# Patient Record
Sex: FEMALE | Race: WHITE | NOT HISPANIC OR LATINO | Employment: FULL TIME | URBAN - METROPOLITAN AREA
[De-identification: names, ages, dates, MRNs, and addresses within clinical notes are randomized per-mention and may not be internally consistent; named-entity substitution may affect disease eponyms.]

---

## 2017-10-22 ENCOUNTER — APPOINTMENT (OUTPATIENT)
Dept: GENERAL RADIOLOGY | Facility: HOSPITAL | Age: 22
End: 2017-10-22

## 2017-10-22 ENCOUNTER — HOSPITAL ENCOUNTER (EMERGENCY)
Facility: HOSPITAL | Age: 22
Discharge: HOME | End: 2017-10-22
Attending: EMERGENCY MEDICINE | Admitting: EMERGENCY MEDICINE

## 2017-10-22 VITALS
OXYGEN SATURATION: 98 % | DIASTOLIC BLOOD PRESSURE: 89 MMHG | HEIGHT: 66 IN | WEIGHT: 140 LBS | SYSTOLIC BLOOD PRESSURE: 137 MMHG | TEMPERATURE: 99.1 F | RESPIRATION RATE: 18 BRPM | BODY MASS INDEX: 22.5 KG/M2 | HEART RATE: 72 BPM
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
## 2017-10-22 DIAGNOSIS — S16.1XXA STRAIN OF NECK MUSCLE, INITIAL ENCOUNTER: ICD-10-CM

## 2017-10-22 PROCEDURE — 99282 EMERGENCY DEPT VISIT SF MDM: CPT

## 2017-10-22 PROCEDURE — 72050 X-RAY EXAM NECK SPINE 4/5VWS: CPT

## 2017-10-22 RX ORDER — DESOGESTREL AND ETHINYL ESTRADIOL 0.15-0.03
1 KIT ORAL DAILY
COMMUNITY

## 2017-10-22 RX ORDER — MONTELUKAST SODIUM 10 MG/1
10 TABLET ORAL NIGHTLY
COMMUNITY

## 2017-10-22 RX ORDER — CETIRIZINE HYDROCHLORIDE 10 MG/1
10 TABLET ORAL DAILY
COMMUNITY

## 2017-10-22 RX ORDER — DICLOFENAC SODIUM 75 MG/1
75 TABLET, DELAYED RELEASE ORAL 2 TIMES DAILY PRN
Qty: 20 TABLET | Refills: 0 | Status: SHIPPED | OUTPATIENT
Start: 2017-10-22

## 2017-10-22 RX ORDER — CYCLOBENZAPRINE HCL 10 MG
10 TABLET ORAL 3 TIMES DAILY PRN
Qty: 21 TABLET | Refills: 0 | Status: SHIPPED | OUTPATIENT
Start: 2017-10-22
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
## 2023-09-01 ENCOUNTER — OFFICE VISIT (OUTPATIENT)
Dept: CARDIOLOGY | Facility: CLINIC | Age: 28
End: 2023-09-01

## 2023-09-01 VITALS
BODY MASS INDEX: 23.14 KG/M2 | HEIGHT: 66 IN | SYSTOLIC BLOOD PRESSURE: 116 MMHG | DIASTOLIC BLOOD PRESSURE: 72 MMHG | OXYGEN SATURATION: 100 % | WEIGHT: 144 LBS | HEART RATE: 53 BPM

## 2023-09-01 DIAGNOSIS — R00.1 BRADYCARDIA, SINUS: Primary | ICD-10-CM

## 2023-09-01 RX ORDER — COPPER 313.4 MG/1
1 INTRAUTERINE DEVICE INTRAUTERINE ONCE
COMMUNITY

## 2023-09-01 RX ORDER — ALBUTEROL SULFATE 90 UG/1
2 AEROSOL, METERED RESPIRATORY (INHALATION) EVERY 6 HOURS PRN
COMMUNITY

## 2023-09-01 NOTE — PROGRESS NOTES
Subjective:     Encounter Date:09/01/23      Patient ID: Sonal Temple is a 27 y.o. female.    Chief Complaint:  History of Present Illness    Dear Benjamin KRUEGER,    I had the pleasure of seeing this patient in the office today for initial evaluation and consultation.  I appreciate that you sent her in to see us.  They come in today to be seen for evaluation of an abnormal EKG prior to starting stimulant medication therapy.    Patient had an EKG obtained at [facility], this showed sinus bradycardia with a rate of 57 bpm, no other abnormality was seen.  QT was normal.    This patient has no known cardiac history.  This patient has no history of coronary artery disease, congestive heart failure, rheumatic fever, rheumatic heart disease, congenital heart disease or heart murmur.  This patient has never required invasive cardiovascular evaluation.    She is very active, used to be a competitive , no symptoms.  No prior episodes of presyncope or syncope, no familial history of sudden death.  She denies any chest pain, pressure, tightness, squeezing, or heartburn.  She has not experienced any feeling of palpitations, tachycardia or heart racing and no presyncope or syncope.  There have not been any problems with dizziness or lightheadedness.  There have not been any orthopnea or PND, and no problems with lower extremity edema.  She denies any shortness of breath at rest or with activity and has not had any wheezing.  She has not had any problems with unexplained nausea or vomiting. She has continued to perform daily activities of living without any specific problem or change in the level of activity.  She has not been recently hospitalized for any reason.    The following portions of the patient's history were reviewed and updated as appropriate: allergies, current medications, past family history, past medical history, past social history, past surgical history and problem list.    Procedures      "  Objective:     Vitals:    09/01/23 1005   BP: 116/72   BP Location: Left arm   Patient Position: Sitting   Pulse: 53   SpO2: 100%   Weight: 65.3 kg (144 lb)   Height: 167.6 cm (66\")     Body mass index is 23.24 kg/mý.      Vitals reviewed.   Constitutional:       General: Not in acute distress.     Appearance: Well-developed. Not diaphoretic.   Eyes:      General:         Right eye: No discharge.         Left eye: No discharge.      Conjunctiva/sclera: Conjunctivae normal.   HENT:      Head: Normocephalic and atraumatic.      Nose: Nose normal.   Neck:      Thyroid: No thyromegaly.      Trachea: No tracheal deviation.   Pulmonary:      Effort: Pulmonary effort is normal. No respiratory distress.      Breath sounds: Normal breath sounds. No stridor.   Chest:      Chest wall: Not tender to palpatation.   Cardiovascular:      Normal rate. Regular rhythm.      Murmurs: There is no murmur.      . No S3 gallop. No click. No rub.   Pulses:     Intact distal pulses.   Edema:     Peripheral edema absent.   Abdominal:      General: Bowel sounds are normal. There is no distension.      Palpations: Abdomen is soft. There is no abdominal mass.   Musculoskeletal: Normal range of motion.         General: No tenderness or deformity.      Cervical back: Normal range of motion and neck supple. Skin:     General: Skin is warm and dry.      Findings: No erythema or rash.   Neurological:      Mental Status: Alert.   Psychiatric:         Attention and Perception: Attention normal.       Data and records reviewed:     No results found for: GLUCOSE, BUN, CREATININE, EGFRRESULT, EGFR, BCR, K, CO2, CALCIUM, PROTENTOTREF, ALBUMIN, BILITOT, AST, ALT  No results found for: CHOL  No results found for: TRIG  No results found for: HDL  No results found for: LDL  No results found for: VLDL  No results found for: LDLHDL  CBC          10/19/2022    15:01   CBC   WBC 7.32       RBC 4.50       Hemoglobin 14.4       Hematocrit 43.4       MCV 96.4     "   MCH 32.0       MCHC 33.2       RDW 11.9       Platelets 382          Details          This result is from an external source.             No radiology results for the last 90 days.          Assessment:          Diagnosis Plan   1. Bradycardia, sinus               Plan:       Patient's EKG demonstrates sinus bradycardia which is a benign finding in a prior , no contraindication to utilization of stimulant medication therapy.    Thank you very much for allowing us to participate in the care of this pleasant patient.  Please don't hesitate to call if I can be of assistance in any way.      Current Outpatient Medications:     albuterol sulfate  (90 Base) MCG/ACT inhaler, Inhale 2 puffs Every 6 (Six) Hours As Needed., Disp: , Rfl:     cetirizine (zyrTEC) 10 MG tablet, Take 1 tablet by mouth Daily., Disp: , Rfl:     Paragard Intrauterine Copper intrauterine device IUD, 1 each by Intrauterine route 1 (One) Time., Disp: , Rfl:     cyclobenzaprine (FLEXERIL) 10 MG tablet, Take 1 tablet by mouth 3 (Three) Times a Day As Needed for Muscle Spasms. (Patient not taking: Reported on 9/1/2023), Disp: 21 tablet, Rfl: 0    desogestrel-ethinyl estradiol (APRI) 0.15-30 MG-MCG per tablet, Take 1 tablet by mouth Daily. (Patient not taking: Reported on 9/1/2023), Disp: , Rfl:     diclofenac (VOLTAREN) 75 MG EC tablet, Take 1 tablet by mouth 2 (Two) Times a Day As Needed (pain). (Patient not taking: Reported on 9/1/2023), Disp: 20 tablet, Rfl: 0    montelukast (SINGULAIR) 10 MG tablet, Take 10 mg by mouth Every Night. (Patient not taking: Reported on 9/1/2023), Disp: , Rfl:          No follow-ups on file.

## 2023-09-12 ENCOUNTER — TELEPHONE (OUTPATIENT)
Dept: CARDIOLOGY | Facility: CLINIC | Age: 28
End: 2023-09-12
Payer: COMMERCIAL

## 2023-09-12 NOTE — TELEPHONE ENCOUNTER
Pt lvm stating she was cleared by Dr. Ibarra for stimulants. Says [facility] never received anything adv of that?    No nausea or vomiting

## 2023-09-13 NOTE — TELEPHONE ENCOUNTER
Faxed patient last office note to [facility] at [phone] per Dr. Ibarra. Received fax confirmation.    Renate Warner MA  9/13/23 536f

## 2024-06-11 ENCOUNTER — HOSPITAL ENCOUNTER (EMERGENCY)
Facility: HOSPITAL | Age: 29
Discharge: HOME | End: 2024-06-12
Attending: EMERGENCY MEDICINE
Payer: COMMERCIAL

## 2024-06-11 DIAGNOSIS — R25.3 FASCICULATIONS: Primary | ICD-10-CM

## 2024-06-11 PROCEDURE — 85025 COMPLETE CBC W/AUTO DIFF WBC: CPT | Performed by: EMERGENCY MEDICINE

## 2024-06-11 PROCEDURE — 82550 ASSAY OF CK (CPK): CPT | Performed by: EMERGENCY MEDICINE

## 2024-06-11 PROCEDURE — 96360 HYDRATION IV INFUSION INIT: CPT

## 2024-06-11 PROCEDURE — 80053 COMPREHEN METABOLIC PANEL: CPT | Performed by: EMERGENCY MEDICINE

## 2024-06-11 PROCEDURE — 83735 ASSAY OF MAGNESIUM: CPT | Performed by: EMERGENCY MEDICINE

## 2024-06-11 PROCEDURE — 84703 CHORIONIC GONADOTROPIN ASSAY: CPT | Performed by: EMERGENCY MEDICINE

## 2024-06-11 PROCEDURE — 99283 EMERGENCY DEPT VISIT LOW MDM: CPT

## 2024-06-11 PROCEDURE — 25810000003 LACTATED RINGERS SOLUTION: Performed by: EMERGENCY MEDICINE

## 2024-06-11 RX ADMIN — SODIUM CHLORIDE, POTASSIUM CHLORIDE, SODIUM LACTATE AND CALCIUM CHLORIDE 1000 ML: 600; 310; 30; 20 INJECTION, SOLUTION INTRAVENOUS at 23:50

## 2024-06-12 VITALS
OXYGEN SATURATION: 100 % | HEART RATE: 64 BPM | DIASTOLIC BLOOD PRESSURE: 66 MMHG | BODY MASS INDEX: 22.5 KG/M2 | WEIGHT: 140 LBS | HEIGHT: 66 IN | SYSTOLIC BLOOD PRESSURE: 112 MMHG | RESPIRATION RATE: 18 BRPM | TEMPERATURE: 97.5 F

## 2024-06-12 LAB
ALBUMIN SERPL-MCNC: 4.5 G/DL (ref 3.5–5.2)
ALBUMIN/GLOB SERPL: 1.5 G/DL
ALP SERPL-CCNC: 44 U/L (ref 39–117)
ALT SERPL W P-5'-P-CCNC: 5 U/L (ref 1–33)
ANION GAP SERPL CALCULATED.3IONS-SCNC: 11.4 MMOL/L (ref 5–15)
AST SERPL-CCNC: 12 U/L (ref 1–32)
BASOPHILS # BLD AUTO: 0.06 10*3/MM3 (ref 0–0.2)
BASOPHILS NFR BLD AUTO: 0.6 % (ref 0–1.5)
BILIRUB SERPL-MCNC: 0.4 MG/DL (ref 0–1.2)
BUN SERPL-MCNC: 10 MG/DL (ref 6–20)
BUN/CREAT SERPL: 14.1 (ref 7–25)
CALCIUM SPEC-SCNC: 9.4 MG/DL (ref 8.6–10.5)
CHLORIDE SERPL-SCNC: 102 MMOL/L (ref 98–107)
CK SERPL-CCNC: 69 U/L (ref 20–180)
CO2 SERPL-SCNC: 23.6 MMOL/L (ref 22–29)
CREAT SERPL-MCNC: 0.71 MG/DL (ref 0.57–1)
DEPRECATED RDW RBC AUTO: 43.2 FL (ref 37–54)
EGFRCR SERPLBLD CKD-EPI 2021: 118.2 ML/MIN/1.73
EOSINOPHIL # BLD AUTO: 0.15 10*3/MM3 (ref 0–0.4)
EOSINOPHIL NFR BLD AUTO: 1.5 % (ref 0.3–6.2)
ERYTHROCYTE [DISTWIDTH] IN BLOOD BY AUTOMATED COUNT: 13.1 % (ref 12.3–15.4)
GLOBULIN UR ELPH-MCNC: 3.1 GM/DL
GLUCOSE SERPL-MCNC: 89 MG/DL (ref 65–99)
HCG SERPL QL: NEGATIVE
HCT VFR BLD AUTO: 37.5 % (ref 34–46.6)
HGB BLD-MCNC: 13 G/DL (ref 12–15.9)
IMM GRANULOCYTES # BLD AUTO: 0.02 10*3/MM3 (ref 0–0.05)
IMM GRANULOCYTES NFR BLD AUTO: 0.2 % (ref 0–0.5)
LYMPHOCYTES # BLD AUTO: 2.64 10*3/MM3 (ref 0.7–3.1)
LYMPHOCYTES NFR BLD AUTO: 26.9 % (ref 19.6–45.3)
MAGNESIUM SERPL-MCNC: 2 MG/DL (ref 1.6–2.6)
MCH RBC QN AUTO: 31.4 PG (ref 26.6–33)
MCHC RBC AUTO-ENTMCNC: 34.7 G/DL (ref 31.5–35.7)
MCV RBC AUTO: 90.6 FL (ref 79–97)
MONOCYTES # BLD AUTO: 0.62 10*3/MM3 (ref 0.1–0.9)
MONOCYTES NFR BLD AUTO: 6.3 % (ref 5–12)
NEUTROPHILS NFR BLD AUTO: 6.34 10*3/MM3 (ref 1.7–7)
NEUTROPHILS NFR BLD AUTO: 64.5 % (ref 42.7–76)
NRBC BLD AUTO-RTO: 0 /100 WBC (ref 0–0.2)
PLATELET # BLD AUTO: 286 10*3/MM3 (ref 140–450)
PMV BLD AUTO: 8.9 FL (ref 6–12)
POTASSIUM SERPL-SCNC: 4 MMOL/L (ref 3.5–5.2)
PROT SERPL-MCNC: 7.6 G/DL (ref 6–8.5)
RBC # BLD AUTO: 4.14 10*6/MM3 (ref 3.77–5.28)
SODIUM SERPL-SCNC: 137 MMOL/L (ref 136–145)
WBC NRBC COR # BLD AUTO: 9.83 10*3/MM3 (ref 3.4–10.8)

## 2024-06-12 RX ORDER — METHOCARBAMOL 750 MG/1
750 TABLET, FILM COATED ORAL 3 TIMES DAILY PRN
Qty: 20 TABLET | Refills: 0 | Status: SHIPPED | OUTPATIENT
Start: 2024-06-12

## 2024-06-12 NOTE — ED PROVIDER NOTES
SHARED VISIT: This visit was performed by BOTH a physician and an APC. The substantive portion of the medical decision making was performed by this attesting physician who made or approved the management plan and takes responsibility for patient management. All studies in the APC note (if performed) were independently interpreted by me.      The JOLANTA and I have discussed this patient's history, physical exam, and treatment plan.  I have reviewed the documentation and personally had a face to face interaction with the patient. I affirm the documentation and agree with the treatment and plan.  The attached note describes my personal findings.       I provided a substantive portion of the care of the patient.  I personally performed the physical exam in its entirety, and below are my findings.        History  27-year-old female presents with complaints of muscle spasms throughout the body that been going on tonight.  She did have recent dental extraction and took a hydrocodone.  Denies recent illness other than this.    Physical Exam  Vital Signs reviewed  GENERAL: Alert pleasant female no obvious distress.  Triage vitals reviewed notable for benign blood pressure heart rate and temperature.  HENT: nares patent  EYES: no scleral icterus  CV: regular rhythm, regular rate-no murmur  RESPIRATORY: normal effort, clear to auscultation bilaterally  ABDOMEN: soft  MUSCULOSKELETAL: no deformity  NEURO: Strength sensation and coordination are grossly intact.  Speech and mentation are unremarkable  SKIN: warm, dry      Assessment and Data Review    ED Course as of 06/12/24 0057   Wed Jun 12, 2024   0004 I discussed the case with Dr. العراقي and they agree to evaluate the patient at the bedside.      [CC]   0012 Hemoglobin: 13.0 [CC]   0042 Creatine Kinase: 69 [CC]   0042 Magnesium: 2.0 [CC]   0042 Potassium: 4.0 [CC]   0044 I rechecked the patient.  I discussed the patient's labs, radiology findings (including all incidental  findings), diagnosis, and plan for discharge.  A repeat exam reveals no new worrisome changes from my initial exam findings.  The patient understands that the fact that they are being discharged does not denote that nothing is abnormal, it indicates that no clinical emergency is present and that they must follow-up as directed in order to properly maintain their health.  Follow-up instructions (specifically listed below) and return to ER precautions were given at this time.  I specifically instructed the patient to follow-up with their PCP.  The patient understands and agrees with the plan, and is ready for discharge.  All questions answered.   [CC]      ED Course User Index  [CC] Kaylee Moore PA-C     I discussed treatment and evaluation this patient with JESUS ALBERTO Moore.  Labs reviewed are quite benign without significant electrolyte disturbance, anemia or signs of infection.    Rather than true spasms, it sounds like you are dealing more fasciculations which may be related to stress from recent surgery, medication reaction.  Labs do not suggest dehydration or significant electrolyte disturbance.  Would encourage patient to avoid hydrocodone, get plenty of rest and fluids and I think symptoms should resolve on their own.         Yuri العراقي MD  06/12/24 0058

## 2024-06-12 NOTE — DISCHARGE INSTRUCTIONS
Please follow-up with your PCP.    Rest.  Increase fluid intake.  Take muscle relaxers as needed    Although you are being discharged in the ED today, I encouraged her to return for worsening symptoms.  Things can, and do, change such a treatment at home with medication may not be adequate.  Specifically I recommend returning for chest pain or discomfort, difficulty breathing, persistent vomiting or difficulty holding down liquids or medications, fever > 102.0 F,  or any other worsening or alarming symptoms.     You have been evaluated in the emergency department for your presenting symptoms and deemed appropriate for discharge home.  Understand that your health care does not end here today.  It is important that your primary care physician be made aware of your visit.  I recommend calling your primary care provider in the next 48 hours to arrange follow-up care.  Your primary care provider needs to review your treatment and recovery to ensure that no further treatment is necessary.  Additional testing or procedures may be necessary as an outpatient at the discretion of your primary care provider.    I also recommend following up with your PCP for recheck of your blood pressure and treatment as needed.

## 2024-06-12 NOTE — ED PROVIDER NOTES
EMERGENCY DEPARTMENT ENCOUNTER  Room Number:  06/06  PCP: Provider, No Known  Independent Historians: Patient      HPI:  Chief Complaint: had concerns including Spasms.     A complete HPI/ROS/PMH/PSH/SH/FH are unobtainable due to: None    Chronic or social conditions impacting patient care (Social Determinants of Health): None      Context: Sonal Temple is a 28 y.o. female with a medical history of asthma who presents emergency department with full body muscle spasms that have been ongoing since 530 this afternoon.  Patient says that it started with her face where she felt like her face was spasming, particularly around her mouth.  She says now she is having involuntary spasming and twitching in her legs and hands.  She says it is happening about every 10 to 15 minutes.  She denies any pain but says her muscles just feel very fatigued.  She denies that this is ever happened in the past.  She denies a history of electrolyte abnormalities or thyroid dysfunction.  She is currently taking hydrocodone as she had a tooth pulled yesterday.  She was not sedated during this procedure.  She says she did not take her evening dose of hydrocodone because she was not having any pain.  She has never taken hydrocodone before but denies ever having a reaction like this in the past.  Patient takes Lexapro but denies any recent medication adjustments.  She denies nausea, vomiting or diarrhea.  She denies fever or chills.      Review of prior external notes (non-ED) -and- Review of prior external test results outside of this encounter:   I reviewed labs from 9/21/2023, hemoglobin 12.2, potassium 3.6, sodium 142      PAST MEDICAL HISTORY  Active Ambulatory Problems     Diagnosis Date Noted    No Active Ambulatory Problems     Resolved Ambulatory Problems     Diagnosis Date Noted    No Resolved Ambulatory Problems     Past Medical History:   Diagnosis Date    Abnormal ECG     Asthma          PAST SURGICAL HISTORY  Past Surgical History:    Procedure Laterality Date    FOOT SURGERY      SINUS SURGERY           FAMILY HISTORY  Family History   Problem Relation Age of Onset    Clotting disorder Maternal Grandfather     Stroke Paternal Grandmother     Diabetes Paternal Grandmother          SOCIAL HISTORY  Social History     Socioeconomic History    Marital status:    Tobacco Use    Smoking status: Never   Vaping Use    Vaping status: Never Used   Substance and Sexual Activity    Alcohol use: Yes     Comment: socially    Drug use: No         ALLERGIES  Penicillins      REVIEW OF SYSTEMS  Included in HPI  All systems reviewed and negative except for those discussed in HPI.      PHYSICAL EXAM    I have reviewed the triage vital signs and nursing notes.    ED Triage Vitals [06/11/24 2328]   Temp Heart Rate Resp BP SpO2   97.5 °F (36.4 °C) 85 18 126/88 100 %      Temp src Heart Rate Source Patient Position BP Location FiO2 (%)   Tympanic Monitor -- -- --       Physical Exam  Constitutional:       General: She is not in acute distress.     Appearance: Normal appearance. She is obese.   HENT:      Head: Normocephalic and atraumatic.      Nose: Nose normal.      Mouth/Throat:      Mouth: Mucous membranes are moist.      Comments: Socket from dental extraction is without any signs of infection.  Eyes:      Extraocular Movements: Extraocular movements intact.      Pupils: Pupils are equal, round, and reactive to light.   Cardiovascular:      Rate and Rhythm: Normal rate and regular rhythm.      Pulses: Normal pulses.      Heart sounds: Normal heart sounds.   Pulmonary:      Effort: Pulmonary effort is normal. No respiratory distress.      Breath sounds: Normal breath sounds.   Abdominal:      General: Abdomen is flat. There is no distension.      Palpations: Abdomen is soft.      Tenderness: There is no abdominal tenderness.   Musculoskeletal:         General: Normal range of motion.      Cervical back: Normal range of motion and neck supple.       Comments: Some mild involuntary fasciculations in the legs during my exam.  No drawing of the hands or spasming of the face or neck.  Spontaneously moving all extremities, sensory and motor grossly intact.  Limbs are warm and well-perfused.  2+ peripheral pulses in all 4 extremities.   Skin:     General: Skin is warm and dry.      Capillary Refill: Capillary refill takes less than 2 seconds.   Neurological:      General: No focal deficit present.      Mental Status: She is alert and oriented to person, place, and time.   Psychiatric:         Mood and Affect: Mood normal.         Behavior: Behavior normal.           LAB RESULTS  Recent Results (from the past 24 hour(s))   Comprehensive Metabolic Panel    Collection Time: 06/11/24 11:49 PM    Specimen: Blood   Result Value Ref Range    Glucose 89 65 - 99 mg/dL    BUN 10 6 - 20 mg/dL    Creatinine 0.71 0.57 - 1.00 mg/dL    Sodium 137 136 - 145 mmol/L    Potassium 4.0 3.5 - 5.2 mmol/L    Chloride 102 98 - 107 mmol/L    CO2 23.6 22.0 - 29.0 mmol/L    Calcium 9.4 8.6 - 10.5 mg/dL    Total Protein 7.6 6.0 - 8.5 g/dL    Albumin 4.5 3.5 - 5.2 g/dL    ALT (SGPT) 5 1 - 33 U/L    AST (SGOT) 12 1 - 32 U/L    Alkaline Phosphatase 44 39 - 117 U/L    Total Bilirubin 0.4 0.0 - 1.2 mg/dL    Globulin 3.1 gm/dL    A/G Ratio 1.5 g/dL    BUN/Creatinine Ratio 14.1 7.0 - 25.0    Anion Gap 11.4 5.0 - 15.0 mmol/L    eGFR 118.2 >60.0 mL/min/1.73   hCG, Serum, Qualitative    Collection Time: 06/11/24 11:49 PM    Specimen: Blood   Result Value Ref Range    HCG Qualitative Negative Negative   Magnesium    Collection Time: 06/11/24 11:49 PM    Specimen: Blood   Result Value Ref Range    Magnesium 2.0 1.6 - 2.6 mg/dL   CK    Collection Time: 06/11/24 11:49 PM    Specimen: Blood   Result Value Ref Range    Creatine Kinase 69 20 - 180 U/L   CBC Auto Differential    Collection Time: 06/11/24 11:49 PM    Specimen: Blood   Result Value Ref Range    WBC 9.83 3.40 - 10.80 10*3/mm3    RBC 4.14 3.77 - 5.28  10*6/mm3    Hemoglobin 13.0 12.0 - 15.9 g/dL    Hematocrit 37.5 34.0 - 46.6 %    MCV 90.6 79.0 - 97.0 fL    MCH 31.4 26.6 - 33.0 pg    MCHC 34.7 31.5 - 35.7 g/dL    RDW 13.1 12.3 - 15.4 %    RDW-SD 43.2 37.0 - 54.0 fl    MPV 8.9 6.0 - 12.0 fL    Platelets 286 140 - 450 10*3/mm3    Neutrophil % 64.5 42.7 - 76.0 %    Lymphocyte % 26.9 19.6 - 45.3 %    Monocyte % 6.3 5.0 - 12.0 %    Eosinophil % 1.5 0.3 - 6.2 %    Basophil % 0.6 0.0 - 1.5 %    Immature Grans % 0.2 0.0 - 0.5 %    Neutrophils, Absolute 6.34 1.70 - 7.00 10*3/mm3    Lymphocytes, Absolute 2.64 0.70 - 3.10 10*3/mm3    Monocytes, Absolute 0.62 0.10 - 0.90 10*3/mm3    Eosinophils, Absolute 0.15 0.00 - 0.40 10*3/mm3    Basophils, Absolute 0.06 0.00 - 0.20 10*3/mm3    Immature Grans, Absolute 0.02 0.00 - 0.05 10*3/mm3    nRBC 0.0 0.0 - 0.2 /100 WBC         RADIOLOGY  No Radiology Exams Resulted Within Past 24 Hours      MEDICATIONS GIVEN IN ER  Medications   lactated ringers bolus 1,000 mL (1,000 mL Intravenous New Bag 6/11/24 4839)         OUTPATIENT MEDICATION MANAGEMENT:  No current Epic-ordered facility-administered medications on file.     Current Outpatient Medications Ordered in Epic   Medication Sig Dispense Refill    albuterol sulfate  (90 Base) MCG/ACT inhaler Inhale 2 puffs Every 6 (Six) Hours As Needed.      cetirizine (zyrTEC) 10 MG tablet Take 1 tablet by mouth Daily.      cyclobenzaprine (FLEXERIL) 10 MG tablet Take 1 tablet by mouth 3 (Three) Times a Day As Needed for Muscle Spasms. (Patient not taking: Reported on 9/1/2023) 21 tablet 0    desogestrel-ethinyl estradiol (APRI) 0.15-30 MG-MCG per tablet Take 1 tablet by mouth Daily. (Patient not taking: Reported on 9/1/2023)      diclofenac (VOLTAREN) 75 MG EC tablet Take 1 tablet by mouth 2 (Two) Times a Day As Needed (pain). (Patient not taking: Reported on 9/1/2023) 20 tablet 0    methocarbamol (ROBAXIN) 750 MG tablet Take 1 tablet by mouth 3 (Three) Times a Day As Needed for Muscle  Spasms. 20 tablet 0    montelukast (SINGULAIR) 10 MG tablet Take 10 mg by mouth Every Night. (Patient not taking: Reported on 9/1/2023)      Paragard Intrauterine Copper intrauterine device IUD 1 each by Intrauterine route 1 (One) Time.               PROGRESS, DATA ANALYSIS, CONSULTS, AND MEDICAL DECISION MAKING  ORDERS PLACED DURING THIS VISIT:  Orders Placed This Encounter   Procedures    Comprehensive Metabolic Panel    hCG, Serum, Qualitative    Magnesium    CK    CBC Auto Differential    CBC & Differential       All labs have been independently interpreted by me.  All radiology studies have been reviewed by me. All EKG's have been independently viewed and interpreted by me.  Discussion below represents my analysis of pertinent findings related to patient's condition, differential diagnosis, treatment plan and final disposition.    Differential diagnosis includes but is not limited to:   Medication reaction, hypokalemia, hyponatremia, hypocalcemia, hypomagnesemia, dehydration, rhabdo    ED Course:  ED Course as of 06/12/24 0053   Wed Jun 12, 2024   0004 I discussed the case with Dr. العراقي and they agree to evaluate the patient at the bedside.      [CC]   0012 Hemoglobin: 13.0 [CC]   0042 Creatine Kinase: 69 [CC]   0042 Magnesium: 2.0 [CC]   0042 Potassium: 4.0 [CC]   0044 I rechecked the patient.  I discussed the patient's labs, radiology findings (including all incidental findings), diagnosis, and plan for discharge.  A repeat exam reveals no new worrisome changes from my initial exam findings.  The patient understands that the fact that they are being discharged does not denote that nothing is abnormal, it indicates that no clinical emergency is present and that they must follow-up as directed in order to properly maintain their health.  Follow-up instructions (specifically listed below) and return to ER precautions were given at this time.  I specifically instructed the patient to follow-up with their PCP.   The patient understands and agrees with the plan, and is ready for discharge.  All questions answered.   [CC]      ED Course User Index  [CC] Kaylee Moore PA-C           AS OF 00:53 EDT VITALS:    BP - 112/66  HR - 64  TEMP - 97.5 °F (36.4 °C) (Tympanic)  O2 SATS - 100%        MDM:  Patient is a generally healthy and well-appearing 28-year-old female presents emergency department today with intermittent body wide fasciculations that have been ongoing for the last few hours.  On arrival here in the emergency department vitals are reassuring, she is afebrile.  On my exam the patient does have some muscle spasms in the legs involuntarily.  She has no pain associated with these episodes.  Patient was evaluated with labs which are overall reassuring.  CK, magnesium, potassium, and calcium are all within normal limits.  Given that she recently started on hydrocodone from recent dental procedure I suspect this may be the source of her symptoms.  I encouraged her to discontinue this especially if her pain is adequately controlled take ibuprofen as a substitution.  I encouraged her to stay well-hydrated.  She was given a liter of IV fluids here in the emergency department today.  Symptoms seem to be improving.  Patient is appropriate for discharge with outpatient follow-up.        DIAGNOSIS  Final diagnoses:   Fasciculations         DISPOSITION  ED Disposition       ED Disposition   Discharge    Condition   Stable    Comment   --                      Please note that portions of this document were completed with a voice recognition program.    Note Disclaimer: At [facility], we believe that sharing information builds trust and better relationships. You are receiving this note because you recently visited [facility]. It is possible you will see health information before a provider has talked with you about it. This kind of information can be easy to misunderstand. To help you fully understand what it means for  your health, we urge you to discuss this note with your provider.     Kaylee Moore PA-C  06/12/24 0054